# Patient Record
Sex: FEMALE | NOT HISPANIC OR LATINO | Employment: OTHER | URBAN - METROPOLITAN AREA
[De-identification: names, ages, dates, MRNs, and addresses within clinical notes are randomized per-mention and may not be internally consistent; named-entity substitution may affect disease eponyms.]

---

## 2024-02-29 ENCOUNTER — APPOINTMENT (OUTPATIENT)
Dept: NEUROSURGERY | Facility: CLINIC | Age: 64
End: 2024-02-29
Payer: MEDICARE

## 2024-02-29 NOTE — PROGRESS NOTES
[facility]  Neurosurgery    History of Present Illness      Donavan Reveles is a 63-year-old female with a PMH significant for Aortic sclerosis, chronic diastolic and systolic HF with an EF of 40-45%, CAD, HTN, HLD, CVA (R frontal and subcortical LF), CKD III, BENJIE not on CPAP, thrombocytopenia, metabolic encephalopathy. Patient was admitted to the hospital for myoclonus workup when she was incidentally found to have a L MCA aneurysm measuring 9mm. Patient was seen by Dr. Botello at [place] who recommended patient for formal angiogram evaluation and treatment and was referred to  for further management. Patient presents to clinic for NPV.                 Objective      Vitals:   There were no vitals taken for this visit.        Physical Exam:            Relevant Results:            Assessment & Plan      Diagnosis:  {There are no diagnoses linked to this encounter. (Refresh or delete this SmartLink)}      Provider Impressions:            Medical History     No past medical history on file.  No past surgical history on file.     No family history on file.  Not on File  No current outpatient medications

## 2024-03-14 ENCOUNTER — LAB (OUTPATIENT)
Dept: LAB | Facility: LAB | Age: 64
End: 2024-03-14
Payer: MEDICARE

## 2024-03-14 ENCOUNTER — OFFICE VISIT (OUTPATIENT)
Dept: NEUROSURGERY | Facility: CLINIC | Age: 64
End: 2024-03-14
Payer: MEDICARE

## 2024-03-14 VITALS
SYSTOLIC BLOOD PRESSURE: 165 MMHG | RESPIRATION RATE: 18 BRPM | HEART RATE: 78 BPM | TEMPERATURE: 96.3 F | BODY MASS INDEX: 33.43 KG/M2 | WEIGHT: 213 LBS | DIASTOLIC BLOOD PRESSURE: 94 MMHG | HEIGHT: 67 IN

## 2024-03-14 DIAGNOSIS — I67.1 CEREBRAL ANEURYSM (HHS-HCC): ICD-10-CM

## 2024-03-14 DIAGNOSIS — Z91.041 CONTRAST MEDIA ALLERGY: ICD-10-CM

## 2024-03-14 DIAGNOSIS — Z79.01 CHRONIC ANTICOAGULATION: ICD-10-CM

## 2024-03-14 DIAGNOSIS — I67.1 CEREBRAL ANEURYSM (HHS-HCC): Primary | ICD-10-CM

## 2024-03-14 LAB
ALBUMIN SERPL BCP-MCNC: 4 G/DL (ref 3.4–5)
ANION GAP SERPL CALC-SCNC: 14 MMOL/L (ref 10–20)
APTT PPP: 40 SECONDS (ref 27–38)
BUN SERPL-MCNC: 39 MG/DL (ref 6–23)
CALCIUM SERPL-MCNC: 9.5 MG/DL (ref 8.6–10.3)
CHLORIDE SERPL-SCNC: 101 MMOL/L (ref 98–107)
CO2 SERPL-SCNC: 30 MMOL/L (ref 21–32)
CREAT SERPL-MCNC: 1.37 MG/DL (ref 0.5–1.05)
EGFRCR SERPLBLD CKD-EPI 2021: 43 ML/MIN/1.73M*2
ERYTHROCYTE [DISTWIDTH] IN BLOOD BY AUTOMATED COUNT: 18 % (ref 11.5–14.5)
GLUCOSE SERPL-MCNC: 93 MG/DL (ref 74–99)
HCT VFR BLD AUTO: 43.9 % (ref 36–46)
HGB BLD-MCNC: 13.5 G/DL (ref 12–16)
INR PPP: 1.4 (ref 0.9–1.1)
MCH RBC QN AUTO: 29.7 PG (ref 26–34)
MCHC RBC AUTO-ENTMCNC: 30.8 G/DL (ref 32–36)
MCV RBC AUTO: 97 FL (ref 80–100)
NRBC BLD-RTO: 0 /100 WBCS (ref 0–0)
PHOSPHATE SERPL-MCNC: 4.5 MG/DL (ref 2.5–4.9)
PLATELET # BLD AUTO: 226 X10*3/UL (ref 150–450)
POTASSIUM SERPL-SCNC: 4.8 MMOL/L (ref 3.5–5.3)
PROTHROMBIN TIME: 15.5 SECONDS (ref 9.8–12.8)
RBC # BLD AUTO: 4.55 X10*6/UL (ref 4–5.2)
SODIUM SERPL-SCNC: 140 MMOL/L (ref 136–145)
WBC # BLD AUTO: 10.3 X10*3/UL (ref 4.4–11.3)

## 2024-03-14 PROCEDURE — 1125F AMNT PAIN NOTED PAIN PRSNT: CPT | Performed by: NEUROLOGICAL SURGERY

## 2024-03-14 PROCEDURE — 85610 PROTHROMBIN TIME: CPT

## 2024-03-14 PROCEDURE — 99214 OFFICE O/P EST MOD 30 MIN: CPT | Performed by: NEUROLOGICAL SURGERY

## 2024-03-14 PROCEDURE — 85730 THROMBOPLASTIN TIME PARTIAL: CPT

## 2024-03-14 PROCEDURE — 36415 COLL VENOUS BLD VENIPUNCTURE: CPT

## 2024-03-14 PROCEDURE — 85027 COMPLETE CBC AUTOMATED: CPT

## 2024-03-14 PROCEDURE — 1159F MED LIST DOCD IN RCRD: CPT | Performed by: NEUROLOGICAL SURGERY

## 2024-03-14 PROCEDURE — 80069 RENAL FUNCTION PANEL: CPT

## 2024-03-14 PROCEDURE — 99204 OFFICE O/P NEW MOD 45 MIN: CPT | Performed by: NEUROLOGICAL SURGERY

## 2024-03-14 RX ORDER — FLUTICASONE FUROATE, UMECLIDINIUM BROMIDE AND VILANTEROL TRIFENATATE 100; 62.5; 25 UG/1; UG/1; UG/1
1 POWDER RESPIRATORY (INHALATION) DAILY
COMMUNITY

## 2024-03-14 RX ORDER — ATORVASTATIN CALCIUM 40 MG/1
40 TABLET, FILM COATED ORAL DAILY
COMMUNITY

## 2024-03-14 RX ORDER — PANTOPRAZOLE SODIUM 40 MG/1
40 TABLET, DELAYED RELEASE ORAL
COMMUNITY

## 2024-03-14 RX ORDER — BUMETANIDE 1 MG/1
2 TABLET ORAL 2 TIMES DAILY
COMMUNITY

## 2024-03-14 RX ORDER — METOPROLOL SUCCINATE 50 MG/1
50 TABLET, EXTENDED RELEASE ORAL DAILY
COMMUNITY

## 2024-03-14 RX ORDER — LISINOPRIL 2.5 MG/1
2.5 TABLET ORAL DAILY
COMMUNITY

## 2024-03-14 RX ORDER — TIOTROPIUM BROMIDE 18 UG/1
1 CAPSULE ORAL; RESPIRATORY (INHALATION)
COMMUNITY

## 2024-03-14 ASSESSMENT — PAIN SCALES - GENERAL: PAINLEVEL: 3

## 2024-03-14 NOTE — PROGRESS NOTES
[facility]  Neurosurgery    History of Present Illness      Donavan Reveles is a 63-year-old female with a PMH significant for Aortic sclerosis, chronic diastolic and systolic HF with an EF of 40-45%, CAD, HTN, HLD, CVA (R frontal and subcortical LF), CKD III, BENJIE not on CPAP, thrombocytopenia, metabolic encephalopathy. Patient was admitted to the hospital for myoclonus workup when she was incidentally found to have a L MCA aneurysm measuring 9mm. Patient was seen by Dr. Botello at [place] who recommended patient for formal angiogram evaluation and treatment and was referred to  for further management. Patient presents to clinic for NPV.      Patient was recently admitted in January for respiratory failure with O2 sats in the 50s.  He required intubation and a 3-week hospitalization.  This was in the setting of her COPD, pneumonia, and COVID.  During the hospital stay she showed evidence of what was felt to be potentially myoclonic jerks versus seizure activity and MR imaging was performed.  This included the MRA which demonstrated the aneurysm.  Reportedly the imaging also showed evidence of small strokes bilaterally.  He was subsequently discharged to a nursing facility, and now has been discharged to stay with her daughter.    Patient does have a family history of brain aneurysm in her mother who she reports had a ruptured aneurysm and ultimately did not survive.    Patient is open medical history is extensive including the history of upper extremity DVT for which she is on ongoing anticoagulation with Eliquis.  Per her report she has a hypercoagulability risk factor.  She has a history of cardiac disease with congestive failure and lower extremity edema.  She reports a history of COPD and sleep apnea.      She is currently still a smoker although she indicates only a few cigarettes intermittently.    Contrast dye is listed as an allergy and she indicates that this was in the context of receiving dye  "previously and experiencing the subsequent day initially diarrhea, and then significant hives.  She had also started on diuretic medication a couple of days prior and is not sure if the reaction was related to that medication.  She has not had IV contrast dye since that time.        Objective      Vitals:   BP (!) 165/94   Pulse 78   Temp 35.7 °C (96.3 °F)   Resp 18   Ht 1.702 m (5' 7\")   Wt 96.6 kg (213 lb)   BMI 33.36 kg/m²         Physical Exam:    Patient is awake and alert.  She has a cough.  She walks with a cane due to knee pain.  She has full extraocular movements.  Face is symmetric.  There is no significant drift, but she has some tremors.  Speech is fluent.  Face is symmetric.    Relevant Results:    She had an MRA dated 1/20/2024 of her head and neck.  She has evidence of Intracranial atherosclerotic disease on the MRA with multiple areas of segmental narrowing.  She does have a left-sided what appears to be M1 aneurysm measuring approximately 1 cm which may be partially involving the M1 segment in a fusiform fashion.        Assessment & Plan      Diagnosis:  Diagnoses and all orders for this visit:  Cerebral aneurysm        Provider Impressions:    This is a 63-year-old patient with multiple medical comorbidities, and a new finding of a M1 aneurysm on MRA.  Given its larger size, we are recommending cerebral angiography for further characterization, and potential consideration of treatment options, given size and family history.  However her medical comorbidities will need to be taken into account if considering potential treatment options.  Discussed that even angiography may have elevated risk for her, given her use of Eliquis, history of chronic kidney disease (although they report that her renal values have been normalized recently), and her history of potential contrast dye allergy which would require premedication.    She and her daughter express understanding, and wish to proceed with " scheduling angio    Total time for this visit including review of imaging and face to face was 50 minutes          Medical History     No past medical history on file.  No past surgical history on file.  Social History     Tobacco Use    Smoking status: Every Day     Types: Cigarettes    Smokeless tobacco: Never     No family history on file.  Allergies   Allergen Reactions    Iodinated Contrast Media Hives     No current outpatient medications

## 2024-03-20 RX ORDER — PREDNISONE 50 MG/1
TABLET ORAL
Qty: 3 TABLET | Refills: 0 | Status: SHIPPED | OUTPATIENT
Start: 2024-03-20

## 2024-03-20 RX ORDER — DIPHENHYDRAMINE HCL 25 MG
50 TABLET ORAL ONCE
Qty: 2 TABLET | Refills: 0 | Status: SHIPPED | OUTPATIENT
Start: 2024-03-20 | End: 2024-03-20

## 2024-03-20 RX ORDER — FAMOTIDINE 20 MG/1
20 TABLET, FILM COATED ORAL ONCE
Qty: 1 TABLET | Refills: 0 | Status: SHIPPED | OUTPATIENT
Start: 2024-03-20 | End: 2024-03-20

## 2024-04-08 ENCOUNTER — APPOINTMENT (OUTPATIENT)
Dept: RADIOLOGY | Facility: HOSPITAL | Age: 64
End: 2024-04-08
Payer: MEDICARE

## 2024-04-22 ENCOUNTER — HOSPITAL ENCOUNTER (OUTPATIENT)
Dept: RADIOLOGY | Facility: HOSPITAL | Age: 64
Discharge: HOME | End: 2024-04-22
Payer: MEDICARE

## 2024-04-22 VITALS
SYSTOLIC BLOOD PRESSURE: 175 MMHG | DIASTOLIC BLOOD PRESSURE: 82 MMHG | TEMPERATURE: 98.1 F | WEIGHT: 213 LBS | RESPIRATION RATE: 18 BRPM | HEIGHT: 67 IN | OXYGEN SATURATION: 92 % | BODY MASS INDEX: 33.43 KG/M2 | HEART RATE: 62 BPM

## 2024-04-22 DIAGNOSIS — I67.1 CEREBRAL ANEURYSM (HHS-HCC): ICD-10-CM

## 2024-04-22 PROCEDURE — C1769 GUIDE WIRE: HCPCS | Performed by: STUDENT IN AN ORGANIZED HEALTH CARE EDUCATION/TRAINING PROGRAM

## 2024-04-22 PROCEDURE — 99153 MOD SED SAME PHYS/QHP EA: CPT | Performed by: NEUROLOGICAL SURGERY

## 2024-04-22 PROCEDURE — 36227 PLACE CATH XTRNL CAROTID: CPT | Performed by: NEUROLOGICAL SURGERY

## 2024-04-22 PROCEDURE — 2550000001 HC RX 255 CONTRASTS: Performed by: NEUROLOGICAL SURGERY

## 2024-04-22 PROCEDURE — 36228 PLACE CATH INTRACRANIAL ART: CPT

## 2024-04-22 PROCEDURE — 99153 MOD SED SAME PHYS/QHP EA: CPT | Performed by: STUDENT IN AN ORGANIZED HEALTH CARE EDUCATION/TRAINING PROGRAM

## 2024-04-22 PROCEDURE — 36225 PLACE CATH SUBCLAVIAN ART: CPT | Performed by: NEUROLOGICAL SURGERY

## 2024-04-22 PROCEDURE — 76377 3D RENDER W/INTRP POSTPROCES: CPT | Performed by: NEUROLOGICAL SURGERY

## 2024-04-22 PROCEDURE — 36224 PLACE CATH CAROTD ART: CPT

## 2024-04-22 PROCEDURE — 36226 PLACE CATH VERTEBRAL ART: CPT | Performed by: NEUROLOGICAL SURGERY

## 2024-04-22 PROCEDURE — 36227 PLACE CATH XTRNL CAROTID: CPT

## 2024-04-22 PROCEDURE — 99152 MOD SED SAME PHYS/QHP 5/>YRS: CPT | Performed by: NEUROLOGICAL SURGERY

## 2024-04-22 PROCEDURE — 36224 PLACE CATH CAROTD ART: CPT | Performed by: NEUROLOGICAL SURGERY

## 2024-04-22 PROCEDURE — 99152 MOD SED SAME PHYS/QHP 5/>YRS: CPT | Performed by: STUDENT IN AN ORGANIZED HEALTH CARE EDUCATION/TRAINING PROGRAM

## 2024-04-22 PROCEDURE — 36226 PLACE CATH VERTEBRAL ART: CPT | Mod: 50 | Performed by: NEUROLOGICAL SURGERY

## 2024-04-22 PROCEDURE — 75710 ARTERY X-RAYS ARM/LEG: CPT | Mod: RT | Performed by: NEUROLOGICAL SURGERY

## 2024-04-22 PROCEDURE — 2720000007 HC OR 272 NO HCPCS: Performed by: STUDENT IN AN ORGANIZED HEALTH CARE EDUCATION/TRAINING PROGRAM

## 2024-04-22 PROCEDURE — 2500000004 HC RX 250 GENERAL PHARMACY W/ HCPCS (ALT 636 FOR OP/ED): Performed by: STUDENT IN AN ORGANIZED HEALTH CARE EDUCATION/TRAINING PROGRAM

## 2024-04-22 PROCEDURE — 36226 PLACE CATH VERTEBRAL ART: CPT

## 2024-04-22 PROCEDURE — 2780000003 HC OR 278 NO HCPCS: Performed by: STUDENT IN AN ORGANIZED HEALTH CARE EDUCATION/TRAINING PROGRAM

## 2024-04-22 PROCEDURE — C1760 CLOSURE DEV, VASC: HCPCS | Performed by: STUDENT IN AN ORGANIZED HEALTH CARE EDUCATION/TRAINING PROGRAM

## 2024-04-22 PROCEDURE — 2500000004 HC RX 250 GENERAL PHARMACY W/ HCPCS (ALT 636 FOR OP/ED): Performed by: NEUROLOGICAL SURGERY

## 2024-04-22 PROCEDURE — 36224 PLACE CATH CAROTD ART: CPT | Mod: 50 | Performed by: NEUROLOGICAL SURGERY

## 2024-04-22 RX ORDER — MIDAZOLAM HYDROCHLORIDE 1 MG/ML
INJECTION INTRAMUSCULAR; INTRAVENOUS
Status: COMPLETED | OUTPATIENT
Start: 2024-04-22 | End: 2024-04-22

## 2024-04-22 RX ORDER — FENTANYL CITRATE 50 UG/ML
INJECTION, SOLUTION INTRAMUSCULAR; INTRAVENOUS
Status: COMPLETED | OUTPATIENT
Start: 2024-04-22 | End: 2024-04-22

## 2024-04-22 RX ADMIN — FENTANYL CITRATE 50 MCG: 50 INJECTION, SOLUTION INTRAMUSCULAR; INTRAVENOUS at 12:00

## 2024-04-22 RX ADMIN — IOHEXOL 100 ML: 350 INJECTION, SOLUTION INTRAVENOUS at 10:51

## 2024-04-22 RX ADMIN — IOHEXOL 100 ML: 350 INJECTION, SOLUTION INTRAVENOUS at 10:50

## 2024-04-22 RX ADMIN — MIDAZOLAM HYDROCHLORIDE 1 MG: 1 INJECTION, SOLUTION INTRAMUSCULAR; INTRAVENOUS at 10:55

## 2024-04-22 RX ADMIN — IOHEXOL 100 ML: 350 INJECTION, SOLUTION INTRAVENOUS at 11:33

## 2024-04-22 RX ADMIN — FENTANYL CITRATE 50 MCG: 50 INJECTION, SOLUTION INTRAMUSCULAR; INTRAVENOUS at 10:58

## 2024-04-22 ASSESSMENT — PAIN SCALES - GENERAL
PAINLEVEL_OUTOF10: 0 - NO PAIN
PAINLEVEL_OUTOF10: 5 - MODERATE PAIN
PAINLEVEL_OUTOF10: 0 - NO PAIN
PAINLEVEL_OUTOF10: 5 - MODERATE PAIN
PAINLEVEL_OUTOF10: 0 - NO PAIN
PAINLEVEL_OUTOF10: 5 - MODERATE PAIN
PAINLEVEL_OUTOF10: 0 - NO PAIN
PAINLEVEL_OUTOF10: 3
PAINLEVEL_OUTOF10: 0 - NO PAIN
PAINLEVEL_OUTOF10: 3
PAINLEVEL_OUTOF10: 0 - NO PAIN

## 2024-04-22 ASSESSMENT — PAIN - FUNCTIONAL ASSESSMENT

## 2024-04-22 NOTE — PROCEDURES
Pre-Procedure Verification and Time Out:  Procedure Location: procedure area  HUDDLE - Pre-procedure Verification:  completed  TIME OUT - Final Verification:  completed immediately prior to procedure start  DEBRIEF: completed    Complications:  None; patient tolerated the procedure well.     Disposition: rPCU  Condition: stable  Specimens Collected: No specimens collected    General Information:   Anesthesia/ sedation: Non-Anesthesia  Indication(s)/Pre - Procedure Diagnoses: L MCA aneurysm  Post-Procedure Diagnosis: L MCA aneurysm, Acomm fenestration  Procedure Name: Diagnostic Cerebral Angiogram  Procedure performed by: Bin  Assistant(s): Jennifer Sierra  Estimated Blood Loss (mL): 10  Specimen: no  Informed Consent: consent obtained and in chart    Access: 5 Fr Sheath in R CFA  Closure: Mynx  Vessels Injected: L ICA +3d, L ECA, L vert, R subclavian, R vert, R ICA, R CFA  Moderate Sedation Time: 90mins  Findings: 8.2mm L MCA bifurcation aneurysm, Acomm fenestration. Full report to follow in PACS dictation

## 2024-04-22 NOTE — PRE-PROCEDURE NOTE
Pre-Procedure H&P     Provider Assessment:  Diagnosis/Reason for Procedure: cerebral aneurysm  Procedure: Diagnostic Cerebral Angiogram  Medications Reviewed:   yes   Prophylatic Antibiotics Needed:   no    Neuro status: A&Ox3, moving all extremities full strength   Mouth Opening OK: yes   Neck Flexibility OK: yes   Sedation Plan: moderate sedation   COVID-19 Risk Consent:  Surgeon has reviewed key risks related to the risk of chris COVID-19 and if they contract COVID-19 what the risks are.

## 2024-04-22 NOTE — DISCHARGE INSTRUCTIONS
Okay to remove dressing and shower tomorrow. Do not submerge the incision in a pool or bath until completely healed, 5-7 days. Do not drive for 48 hours. Have a responsible adult with you for the next 24 hours. Normal activity is unrestricted, no exertional activity or heavy lifting greater than 20 pounds for 7 days. Call your doctor with any heavy bleeding or swelling from the site, weakness or numbness in the extremity, bloody or dark urine.

## 2024-04-22 NOTE — Clinical Note
Procedure complete. Access via right groin. Closure via 5 fr mynx. Hemostasis achieved. 2x2 and tegaderm dressing applied. Dressing clean, dry, and intact. No hematoma. Pt to keep right leg straight for 3 hours post deployment time. VSS. Pt transferred to [facility] report given to RN.

## 2024-04-24 ENCOUNTER — MULTIDISCIPLINARY MEETING (OUTPATIENT)
Dept: NEUROSURGERY | Facility: HOSPITAL | Age: 64
End: 2024-04-24
Payer: MEDICARE

## 2024-05-10 NOTE — PROGRESS NOTES
Cerebrovascular Conference 04/24/24    Case Review: PMH h/o CAD, HTN, CKD, BENJIE, COPD w cor pulmonale, DVT on eliquis (prothrombin gene mutation), tobacco use, fam hx ruptured aneurysm, incidental L MCA aneurysm, DSA 8mm L MCA bifurcation aneurysm      Recommendations:  Follow up in clinic for surgical discussion. Discuss with patient familial history of cerebral aneurysm to evaluate if first line family member.       Cerebrovascular conference is a multidisciplinary conferences attended by neurosurgery, neuro-radiology, APPs, and Rns.

## 2024-05-15 PROBLEM — E66.01 MORBID OBESITY (MULTI): Status: ACTIVE | Noted: 2024-05-15

## 2024-05-15 PROBLEM — G47.33 OSA (OBSTRUCTIVE SLEEP APNEA): Status: ACTIVE | Noted: 2024-05-15

## 2024-05-15 PROBLEM — I50.22 CHRONIC SYSTOLIC HEART FAILURE (MULTI): Status: ACTIVE | Noted: 2024-05-15

## 2024-05-15 PROBLEM — R79.89 ELEVATED TROPONIN: Status: ACTIVE | Noted: 2024-05-15

## 2024-05-15 PROBLEM — N18.9 CKD (CHRONIC KIDNEY DISEASE): Status: ACTIVE | Noted: 2024-05-15

## 2024-05-15 PROBLEM — I82.601: Status: ACTIVE | Noted: 2024-05-15

## 2024-05-15 PROBLEM — I25.10 CAD IN NATIVE ARTERY: Status: ACTIVE | Noted: 2024-05-15

## 2024-05-15 PROBLEM — I27.20 PULMONARY HYPERTENSION (MULTI): Status: ACTIVE | Noted: 2024-05-15

## 2024-05-15 PROBLEM — E78.5 DYSLIPIDEMIA: Status: ACTIVE | Noted: 2024-05-15

## 2024-05-15 PROBLEM — I42.9 CARDIOMYOPATHY (MULTI): Status: ACTIVE | Noted: 2024-05-15

## 2024-05-15 RX ORDER — NAPROXEN SODIUM 220 MG/1
81 TABLET, FILM COATED ORAL
COMMUNITY
Start: 2024-02-02

## 2024-05-15 RX ORDER — FUROSEMIDE 40 MG/1
40 TABLET ORAL DAILY
COMMUNITY
Start: 2023-09-28

## 2024-05-15 RX ORDER — FLUTICASONE PROPIONATE 50 MCG
SPRAY, SUSPENSION (ML) NASAL
COMMUNITY
Start: 2024-04-15

## 2024-05-15 RX ORDER — EMPAGLIFLOZIN 10 MG/1
10 TABLET, FILM COATED ORAL
COMMUNITY

## 2024-05-15 RX ORDER — MICONAZOLE NITRATE 2 %
POWDER (GRAM) TOPICAL
COMMUNITY
Start: 2024-02-02

## 2024-05-15 RX ORDER — HYDRALAZINE HYDROCHLORIDE 10 MG/1
10 TABLET, FILM COATED ORAL 3 TIMES DAILY
COMMUNITY

## 2024-06-13 ENCOUNTER — OFFICE VISIT (OUTPATIENT)
Dept: NEUROSURGERY | Facility: CLINIC | Age: 64
End: 2024-06-13
Payer: MEDICARE

## 2024-06-13 VITALS
TEMPERATURE: 96.3 F | DIASTOLIC BLOOD PRESSURE: 76 MMHG | HEART RATE: 58 BPM | BODY MASS INDEX: 33.43 KG/M2 | SYSTOLIC BLOOD PRESSURE: 139 MMHG | WEIGHT: 213 LBS | HEIGHT: 67 IN

## 2024-06-13 DIAGNOSIS — I67.1 CEREBRAL ARTERIAL ANEURYSM (HHS-HCC): Primary | ICD-10-CM

## 2024-06-13 PROCEDURE — 99215 OFFICE O/P EST HI 40 MIN: CPT | Performed by: NEUROLOGICAL SURGERY

## 2024-06-13 PROCEDURE — 1159F MED LIST DOCD IN RCRD: CPT | Performed by: NEUROLOGICAL SURGERY

## 2024-06-13 NOTE — PROGRESS NOTES
"[facility]  Neurosurgery    History of Present Illness      Rubia Reveles is a 63-year-old female with a PMH significant for Aortic sclerosis, chronic diastolic and systolic HF with an EF of 40-45%, CAD, HTN, HLD, COPD, BENJIE, CVA (R frontal and subcortical LF), CKD III, BENJIE not on CPAP, DVT (Eliquis), thrombocytopenia, metabolic encephalopathy. Patient was admitted to the hospital for myoclonus workup when she was incidentally found to have a L MCA aneurysm measuring 9mm. That hospital course was significant for respiratory failure requiring mechanical intubation and prolonged hospital stay in the setting of COPD, PNA, and COVID. was evaluated in office and recommended for formal cerebral angiogram which was completed on 04/22/24. DSA confirming 8mm L MCA bifurcation aneurysm.     Patient with a known familial history of aneurysm (one family member) rupture with her mother who passed. Current smoker only smokes a few cigarettes intermittently. Patient presents to clinic for FUV and treatment discussion.         Objective      Vitals:   /76   Pulse 58   Temp 35.7 °C (96.3 °F)   Ht 1.702 m (5' 7\")   Wt 96.6 kg (213 lb)   BMI 33.36 kg/m²         Physical Exam:    Awake and alert  Speech fluent  Face symmetric  FLORES        Relevant Results:    Angiogram reviewed - partially fusiform L M2 aneurysm approx 8mm        Assessment & Plan      Diagnosis:  Rubia \"ALLAN\" was seen today for follow-up.  Diagnoses and all orders for this visit:  Cerebral arterial aneurysm ([facility])          Provider Impression:   63-year-old female with multiple comorbidities, including Eliquis for history of upper extremity DVT, COPD with recent hospitalization with respiratory failure, and renal dysfunction (with some increase in creatinine by her report post angiogram), with an unruptured left MCA partially feasible M2 aneurysm.  She does have a single family member with a history of cerebral aneurysm (mother).  Her phases score " is 6 which puts her at a overall less than 2% 5-year risk of rupture.    We discussed the risk benefits and alternatives of management.  The aneurysm not amenable to endovascular therapy.  We discussed the risks of surgical intervention which would include either partial clipping, or complete clipping with bypass.  Given her comorbidities and heightened perioperative risks, and the extent of the surgery, our shared decision making is towards continued surveillance.  We will plan to get an MRA in 6 months time for follow-up imaging.  Counseled r: smoking cessation and ongoing BP management.    Total time for this visit is 40 minutes      Medical History     No past medical history on file.  No past surgical history on file.  Social History     Tobacco Use    Smoking status: Every Day     Types: Cigarettes    Smokeless tobacco: Never     No family history on file.  Allergies   Allergen Reactions    Iodinated Contrast Media Hives    Spironolactone Diarrhea     Current Outpatient Medications   Medication Instructions    albuterol sulfate (Proair Digihaler) 90 mcg/actuation aero powdr breath act w/sensor inhaler 2 puffs, inhalation, Every 4 hours PRN    apixaban (ELIQUIS) 5 mg, oral, 2 times daily    aspirin 81 mg    atorvastatin (LIPITOR) 40 mg, oral, Daily    bumetanide (BUMEX) 2 mg, oral, 2 times daily    diphenhydrAMINE (BENADRYL ALLERGY) 50 mg, oral, Once, Take 1 hour prior to your imaging / procedure    famotidine (PEPCID) 20 mg, oral, Once, To be taken 1 hour prior to procedure    fluticasone (Flonase) 50 mcg/actuation nasal spray instill 2 sprays into each nostril once daily at bedtime    fluticasone-umeclidin-vilanter (Trelegy Ellipta) 100-62.5-25 mcg blister with device 1 puff, inhalation, Daily    furosemide (LASIX) 40 mg, oral, Daily    hydrALAZINE (APRESOLINE) 10 mg, oral, 3 times daily    Jardiance 10 mg, oral, Daily before breakfast    lisinopril 2.5 mg, oral, Daily    metoprolol succinate XL (TOPROL-XL)  50 mg, oral, Daily, Do not crush or chew.    miconazole (Micotin) 2 % powder Apply 1 qi, Topical, BID, 0 Refill(s), Powder, 108.8    pantoprazole (PROTONIX) 40 mg, oral, Daily before breakfast, Do not crush, chew, or split.    predniSONE (Deltasone) 50 mg tablet Take one tab 13 hours to scan time. Take one tab 7 hours before scan time. Take one tab 1 hour prior to scan time.    tiotropium (Spiriva) 18 mcg inhalation capsule 1 capsule, inhalation, Daily RT

## 2024-09-22 DIAGNOSIS — I67.1 CEREBRAL ARTERIAL ANEURYSM (HHS-HCC): Primary | ICD-10-CM
